# Patient Record
Sex: MALE
[De-identification: names, ages, dates, MRNs, and addresses within clinical notes are randomized per-mention and may not be internally consistent; named-entity substitution may affect disease eponyms.]

---

## 2022-08-24 ENCOUNTER — DASHBOARD ENCOUNTERS (OUTPATIENT)
Age: 67
End: 2022-08-24

## 2022-08-24 ENCOUNTER — WEB ENCOUNTER (OUTPATIENT)
Dept: URBAN - METROPOLITAN AREA CLINIC 92 | Facility: CLINIC | Age: 67
End: 2022-08-24

## 2022-08-24 ENCOUNTER — OFFICE VISIT (OUTPATIENT)
Dept: URBAN - METROPOLITAN AREA CLINIC 92 | Facility: CLINIC | Age: 67
End: 2022-08-24
Payer: MEDICARE

## 2022-08-24 VITALS
HEART RATE: 83 BPM | SYSTOLIC BLOOD PRESSURE: 70 MMHG | WEIGHT: 95 LBS | TEMPERATURE: 96.5 F | DIASTOLIC BLOOD PRESSURE: 51 MMHG | BODY MASS INDEX: 13.3 KG/M2 | HEIGHT: 71 IN

## 2022-08-24 DIAGNOSIS — R62.7 FAILURE TO THRIVE IN ADULT: ICD-10-CM

## 2022-08-24 DIAGNOSIS — R13.10 DYSPHAGIA, UNSPECIFIED TYPE: ICD-10-CM

## 2022-08-24 PROCEDURE — 99204 OFFICE O/P NEW MOD 45 MIN: CPT | Performed by: INTERNAL MEDICINE

## 2022-08-24 RX ORDER — PRAVASTATIN SODIUM 40 MG/1
TAKE ONE TABLET BY MOUTH ONE TIME DAILY FOR CHOLESTEROL TABLET ORAL
Qty: 90 UNSPECIFIED | Refills: 0 | Status: ACTIVE | COMMUNITY

## 2022-08-24 RX ORDER — AMLODIPINE BESYLATE 10 MG/1
TAKE ONE TABLET BY MOUTH ONE TIME DAILY FOR BLOOD PRESSURE TABLET ORAL
Qty: 90 UNSPECIFIED | Refills: 0 | Status: ACTIVE | COMMUNITY

## 2022-08-24 NOTE — HPI-TODAY'S VISIT:
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  He recently saw heme-onc, who stated in their note that because the patient is cachectic and with significant weight loss, recommended a PEG tube.  After PEG tube is placed, could consider immunotherapy.

## 2022-08-24 NOTE — EXAM-PHYSICAL EXAM
CONSTITUTIONAL - cachectic, here w/son HEENT - sclerae and conjuntivae appear normal, external nose normal appearance, no nasal discharge NECK - normal appearance, no deformities CHEST - no increased work of breathing, no accessory muscle use CV - no edema, regular rate GI - soft, NTND, no guarding or rigidity, no palpable masses or HSM MSK - no deformities, normal gait SKIN - good turgor, no obvious rashes NEURO - AAOx3 PSYCH - normal mood and appropriate affect

## 2022-09-02 ENCOUNTER — TELEPHONE ENCOUNTER (OUTPATIENT)
Dept: URBAN - METROPOLITAN AREA CLINIC 105 | Facility: CLINIC | Age: 67
End: 2022-09-02

## 2022-09-02 PROBLEM — 129588001: Status: ACTIVE | Noted: 2022-08-24

## 2022-09-02 PROBLEM — 40739000: Status: ACTIVE | Noted: 2022-08-24

## 2022-09-07 ENCOUNTER — OFFICE VISIT (OUTPATIENT)
Dept: URBAN - METROPOLITAN AREA MEDICAL CENTER 12 | Facility: MEDICAL CENTER | Age: 67
End: 2022-09-07
Payer: MEDICARE

## 2022-09-07 DIAGNOSIS — R13.10 ABNORMAL DEGLUTITION: ICD-10-CM

## 2022-09-07 PROCEDURE — 43246 EGD PLACE GASTROSTOMY TUBE: CPT | Performed by: INTERNAL MEDICINE

## 2022-09-12 ENCOUNTER — TELEPHONE ENCOUNTER (OUTPATIENT)
Dept: URBAN - METROPOLITAN AREA CLINIC 105 | Facility: CLINIC | Age: 67
End: 2022-09-12